# Patient Record
Sex: FEMALE | Race: ASIAN | NOT HISPANIC OR LATINO
[De-identification: names, ages, dates, MRNs, and addresses within clinical notes are randomized per-mention and may not be internally consistent; named-entity substitution may affect disease eponyms.]

---

## 2019-08-13 PROBLEM — Z00.00 ENCOUNTER FOR PREVENTIVE HEALTH EXAMINATION: Status: ACTIVE | Noted: 2019-08-13

## 2019-08-16 ENCOUNTER — APPOINTMENT (OUTPATIENT)
Dept: THORACIC SURGERY | Facility: CLINIC | Age: 49
End: 2019-08-16
Payer: MEDICAID

## 2019-08-16 VITALS
BODY MASS INDEX: 28.53 KG/M2 | SYSTOLIC BLOOD PRESSURE: 143 MMHG | HEIGHT: 63 IN | RESPIRATION RATE: 19 BRPM | OXYGEN SATURATION: 98 % | WEIGHT: 161 LBS | TEMPERATURE: 96.2 F | DIASTOLIC BLOOD PRESSURE: 79 MMHG | HEART RATE: 61 BPM

## 2019-08-16 DIAGNOSIS — Z86.79 PERSONAL HISTORY OF OTHER DISEASES OF THE CIRCULATORY SYSTEM: ICD-10-CM

## 2019-08-16 PROCEDURE — 99205 OFFICE O/P NEW HI 60 MIN: CPT

## 2019-08-19 PROBLEM — Z86.79 HISTORY OF HYPERTENSION: Status: RESOLVED | Noted: 2019-08-19 | Resolved: 2019-08-19

## 2019-08-19 RX ORDER — ESOMEPRAZOLE MAGNESIUM 20 MG/1
20 CAPSULE, DELAYED RELEASE ORAL
Refills: 0 | Status: ACTIVE | COMMUNITY

## 2019-10-07 ENCOUNTER — OUTPATIENT (OUTPATIENT)
Dept: OUTPATIENT SERVICES | Facility: HOSPITAL | Age: 49
LOS: 1 days | End: 2019-10-07
Payer: MEDICAID

## 2019-10-07 VITALS
OXYGEN SATURATION: 97 % | HEIGHT: 63 IN | BODY MASS INDEX: 28 KG/M2 | DIASTOLIC BLOOD PRESSURE: 78 MMHG | WEIGHT: 158 LBS | HEART RATE: 59 BPM | TEMPERATURE: 97.8 F | SYSTOLIC BLOOD PRESSURE: 135 MMHG

## 2019-10-07 DIAGNOSIS — Z01.818 ENCOUNTER FOR OTHER PREPROCEDURAL EXAMINATION: ICD-10-CM

## 2019-10-07 LAB
ALBUMIN SERPL ELPH-MCNC: 4.2 G/DL — SIGNIFICANT CHANGE UP (ref 3.3–5)
ALP SERPL-CCNC: 59 U/L — SIGNIFICANT CHANGE UP (ref 40–120)
ALT FLD-CCNC: 10 U/L — SIGNIFICANT CHANGE UP (ref 10–45)
ANION GAP SERPL CALC-SCNC: 9 MMOL/L — SIGNIFICANT CHANGE UP (ref 5–17)
APPEARANCE UR: CLEAR — SIGNIFICANT CHANGE UP
APTT BLD: 39.6 SEC — HIGH (ref 27.5–36.3)
AST SERPL-CCNC: 14 U/L — SIGNIFICANT CHANGE UP (ref 10–40)
BACTERIA # UR AUTO: PRESENT /HPF
BASOPHILS # BLD AUTO: 0.03 K/UL — SIGNIFICANT CHANGE UP (ref 0–0.2)
BASOPHILS NFR BLD AUTO: 0.7 % — SIGNIFICANT CHANGE UP (ref 0–2)
BILIRUB SERPL-MCNC: 0.7 MG/DL — SIGNIFICANT CHANGE UP (ref 0.2–1.2)
BILIRUB UR-MCNC: NEGATIVE — SIGNIFICANT CHANGE UP
BLD GP AB SCN SERPL QL: NEGATIVE — SIGNIFICANT CHANGE UP
BLD GP AB SCN SERPL QL: NEGATIVE — SIGNIFICANT CHANGE UP
BUN SERPL-MCNC: 20 MG/DL — SIGNIFICANT CHANGE UP (ref 7–23)
CALCIUM SERPL-MCNC: 9 MG/DL — SIGNIFICANT CHANGE UP (ref 8.4–10.5)
CHLORIDE SERPL-SCNC: 108 MMOL/L — SIGNIFICANT CHANGE UP (ref 96–108)
CHOLEST SERPL-MCNC: 142 MG/DL — SIGNIFICANT CHANGE UP (ref 10–199)
CO2 SERPL-SCNC: 27 MMOL/L — SIGNIFICANT CHANGE UP (ref 22–31)
COLOR SPEC: YELLOW — SIGNIFICANT CHANGE UP
CREAT SERPL-MCNC: 0.88 MG/DL — SIGNIFICANT CHANGE UP (ref 0.5–1.3)
DIFF PNL FLD: ABNORMAL
EOSINOPHIL # BLD AUTO: 0.18 K/UL — SIGNIFICANT CHANGE UP (ref 0–0.5)
EOSINOPHIL NFR BLD AUTO: 4 % — SIGNIFICANT CHANGE UP (ref 0–6)
EPI CELLS # UR: ABNORMAL /HPF (ref 0–5)
GLUCOSE SERPL-MCNC: 108 MG/DL — HIGH (ref 70–99)
GLUCOSE UR QL: NEGATIVE — SIGNIFICANT CHANGE UP
HCT VFR BLD CALC: 42.2 % — SIGNIFICANT CHANGE UP (ref 34.5–45)
HDLC SERPL-MCNC: 55 MG/DL — SIGNIFICANT CHANGE UP
HGB BLD-MCNC: 13.4 G/DL — SIGNIFICANT CHANGE UP (ref 11.5–15.5)
IMM GRANULOCYTES NFR BLD AUTO: 0.2 % — SIGNIFICANT CHANGE UP (ref 0–1.5)
INR BLD: 1.18 — HIGH (ref 0.88–1.16)
KETONES UR-MCNC: NEGATIVE — SIGNIFICANT CHANGE UP
LEUKOCYTE ESTERASE UR-ACNC: ABNORMAL
LIPID PNL WITH DIRECT LDL SERPL: 73 MG/DL — SIGNIFICANT CHANGE UP
LYMPHOCYTES # BLD AUTO: 1.18 K/UL — SIGNIFICANT CHANGE UP (ref 1–3.3)
LYMPHOCYTES # BLD AUTO: 26.5 % — SIGNIFICANT CHANGE UP (ref 13–44)
MCHC RBC-ENTMCNC: 28.4 PG — SIGNIFICANT CHANGE UP (ref 27–34)
MCHC RBC-ENTMCNC: 31.8 GM/DL — LOW (ref 32–36)
MCV RBC AUTO: 89.4 FL — SIGNIFICANT CHANGE UP (ref 80–100)
MONOCYTES # BLD AUTO: 0.3 K/UL — SIGNIFICANT CHANGE UP (ref 0–0.9)
MONOCYTES NFR BLD AUTO: 6.7 % — SIGNIFICANT CHANGE UP (ref 2–14)
NEUTROPHILS # BLD AUTO: 2.75 K/UL — SIGNIFICANT CHANGE UP (ref 1.8–7.4)
NEUTROPHILS NFR BLD AUTO: 61.9 % — SIGNIFICANT CHANGE UP (ref 43–77)
NITRITE UR-MCNC: NEGATIVE — SIGNIFICANT CHANGE UP
NRBC # BLD: 0 /100 WBCS — SIGNIFICANT CHANGE UP (ref 0–0)
PH UR: 6 — SIGNIFICANT CHANGE UP (ref 5–8)
PLATELET # BLD AUTO: 192 K/UL — SIGNIFICANT CHANGE UP (ref 150–400)
POTASSIUM SERPL-MCNC: 4.4 MMOL/L — SIGNIFICANT CHANGE UP (ref 3.5–5.3)
POTASSIUM SERPL-SCNC: 4.4 MMOL/L — SIGNIFICANT CHANGE UP (ref 3.5–5.3)
PROT SERPL-MCNC: 7.1 G/DL — SIGNIFICANT CHANGE UP (ref 6–8.3)
PROT UR-MCNC: NEGATIVE MG/DL — SIGNIFICANT CHANGE UP
PROTHROM AB SERPL-ACNC: 13.4 SEC — HIGH (ref 10–12.9)
RBC # BLD: 4.72 M/UL — SIGNIFICANT CHANGE UP (ref 3.8–5.2)
RBC # FLD: 12.8 % — SIGNIFICANT CHANGE UP (ref 10.3–14.5)
RBC CASTS # UR COMP ASSIST: < 5 /HPF — SIGNIFICANT CHANGE UP
RH IG SCN BLD-IMP: POSITIVE — SIGNIFICANT CHANGE UP
RH IG SCN BLD-IMP: POSITIVE — SIGNIFICANT CHANGE UP
SODIUM SERPL-SCNC: 144 MMOL/L — SIGNIFICANT CHANGE UP (ref 135–145)
SP GR SPEC: 1.02 — SIGNIFICANT CHANGE UP (ref 1–1.03)
TOTAL CHOLESTEROL/HDL RATIO MEASUREMENT: 2.6 RATIO — LOW (ref 3.3–7.1)
TRIGL SERPL-MCNC: 71 MG/DL — SIGNIFICANT CHANGE UP (ref 10–149)
UROBILINOGEN FLD QL: 0.2 E.U./DL — SIGNIFICANT CHANGE UP
WBC # BLD: 4.45 K/UL — SIGNIFICANT CHANGE UP (ref 3.8–10.5)
WBC # FLD AUTO: 4.45 K/UL — SIGNIFICANT CHANGE UP (ref 3.8–10.5)
WBC UR QL: < 5 /HPF — SIGNIFICANT CHANGE UP

## 2019-10-07 PROCEDURE — 86850 RBC ANTIBODY SCREEN: CPT

## 2019-10-07 PROCEDURE — 80053 COMPREHEN METABOLIC PANEL: CPT

## 2019-10-07 PROCEDURE — 80061 LIPID PANEL: CPT

## 2019-10-07 PROCEDURE — 85730 THROMBOPLASTIN TIME PARTIAL: CPT

## 2019-10-07 PROCEDURE — 86900 BLOOD TYPING SEROLOGIC ABO: CPT

## 2019-10-07 PROCEDURE — 85610 PROTHROMBIN TIME: CPT

## 2019-10-07 PROCEDURE — 86901 BLOOD TYPING SEROLOGIC RH(D): CPT

## 2019-10-07 PROCEDURE — 93005 ELECTROCARDIOGRAM TRACING: CPT

## 2019-10-07 PROCEDURE — 85025 COMPLETE CBC W/AUTO DIFF WBC: CPT

## 2019-10-07 PROCEDURE — 93010 ELECTROCARDIOGRAM REPORT: CPT

## 2019-10-07 PROCEDURE — 81001 URINALYSIS AUTO W/SCOPE: CPT

## 2019-10-23 RX ORDER — ALBUTEROL SULFATE 90 UG/1
108 (90 BASE) AEROSOL, METERED RESPIRATORY (INHALATION)
Refills: 0 | Status: ACTIVE | COMMUNITY

## 2019-10-23 RX ORDER — RANOLAZINE 500 MG/1
500 TABLET, FILM COATED, EXTENDED RELEASE ORAL
Refills: 0 | Status: ACTIVE | COMMUNITY

## 2019-10-23 RX ORDER — DOCUSATE SODIUM 100 MG/1
100 CAPSULE ORAL
Refills: 0 | Status: ACTIVE | COMMUNITY

## 2019-10-23 RX ORDER — TIOTROPIUM BROMIDE AND OLODATEROL 3.124; 2.736 UG/1; UG/1
SPRAY, METERED RESPIRATORY (INHALATION)
Refills: 0 | Status: ACTIVE | COMMUNITY

## 2019-10-23 RX ORDER — ATORVASTATIN CALCIUM 10 MG/1
10 TABLET, FILM COATED ORAL
Refills: 0 | Status: ACTIVE | COMMUNITY

## 2019-10-24 NOTE — ASU PATIENT PROFILE, ADULT - VISION (WITH CORRECTIVE LENSES IF THE PATIENT USUALLY WEARS THEM):
wears glasses wears glasses/Normal vision: sees adequately in most situations; can see medication labels, newsprint

## 2019-10-24 NOTE — CONSULT LETTER
[FreeTextEntry2] : Dr Joy Noyola [FreeTextEntry3] : Keith Florez MD\par Professor, Cardiovascular & Thoracic Surgery\par [facility]\par Director of the [facility] \par Director of Thoracic Surgery, [facility]\par [facility]\par [address]\par [facility] 4th Floor\par [place] [address]\par Phone: [phone]\par Fax: [phone]

## 2019-10-24 NOTE — ASSESSMENT
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 She reports persistent SOB and dysphagia and had hiatal hernia work up with at [facility] last year, but did not go through with surgery. She is tolerating PO intake and denies melena, constipation, abdominal pain. \par \par CT Chest 9/20/18\par - large 9.1cm fat-containing hiatal hernia. Underlying lipoma is not excluded\par - RML 4mm nodule\par - Aberrant right subclavian artery\par - probably subcentimeter liver cyst\par \par Upper GI Series 10/9/18\par - Normal GI series with no effect of large posterior mediastinal fatty mass on the esophagus or stomach. 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 \par \par CT Coronary 2/14/19: large right posterior hernia with adjacent compressive atelectasis of the RLL. \par \par Patient reports she is busy the next few months and unable to schedule surgery until December. Will repeat imaging prior to surgery. She is comfortable at rest and tolerating PO intake well. Instructed patient to RTC sooner if worsening SOB, abdominal pain, anemia. \par \par I have reviewed the patient's medical records and diagnostic images at the time of this office consultation and have made the following recommendation.\par Plan:\par 1. Medical clearance, cardiac clearance\par 2. Repeat CT Chest, UGI XR\par 3. EGD, laparoscopic, robotic assisted, repair of hiatal hernia, possible Toupet fundoplication, 12/6/19\par 4. Request manometry study EGD done at [facility]

## 2019-10-24 NOTE — HISTORY OF PRESENT ILLNESS
[FreeTextEntry1] : 49 year old female, nonsmoker, Cantonese speaking with pmhx of HTN, as referred by PCP Dr Joy Noyola for a large hiatal hernia. She reports persistent SOB and dysphagia and had hiatal hernia work up with at [facility] last year, but did not go through with surgery. She is tolerating PO intake and denies melena, constipation, abdominal pain. \par \par CT Chest 9/20/18\par - large 9.1cm fat-containing hiatal hernia. Underlying lipoma is not excluded\par - RML 4mm nodule\par - Aberrant right subclavian artery\par - probably subcentimeter liver cyst\par \par Upper GI Series 10/9/18\par - Normal GI series with no effect of large posterior mediastinal fatty mass on the esophagus or stomach. 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 \par \par CT Coronary 2/14/19: large right posterior hernia with adjacent compressive atelectasis of the RLL. \par

## 2019-10-25 ENCOUNTER — APPOINTMENT (OUTPATIENT)
Dept: THORACIC SURGERY | Facility: HOSPITAL | Age: 49
End: 2019-10-25

## 2019-10-25 ENCOUNTER — INPATIENT (INPATIENT)
Facility: HOSPITAL | Age: 49
LOS: 0 days | Discharge: HOME | DRG: 328 | End: 2019-10-26
Attending: THORACIC SURGERY (CARDIOTHORACIC VASCULAR SURGERY) | Admitting: THORACIC SURGERY (CARDIOTHORACIC VASCULAR SURGERY)
Payer: MEDICAID

## 2019-10-25 VITALS
WEIGHT: 160.94 LBS | TEMPERATURE: 99 F | HEART RATE: 71 BPM | HEIGHT: 63 IN | DIASTOLIC BLOOD PRESSURE: 90 MMHG | SYSTOLIC BLOOD PRESSURE: 134 MMHG | RESPIRATION RATE: 16 BRPM | OXYGEN SATURATION: 96 %

## 2019-10-25 DIAGNOSIS — Z98.890 OTHER SPECIFIED POSTPROCEDURAL STATES: Chronic | ICD-10-CM

## 2019-10-25 LAB
ANION GAP SERPL CALC-SCNC: 13 MMOL/L — SIGNIFICANT CHANGE UP (ref 5–17)
APTT BLD: 45.1 SEC — HIGH (ref 27.5–36.3)
BASOPHILS # BLD AUTO: 0.04 K/UL — SIGNIFICANT CHANGE UP (ref 0–0.2)
BASOPHILS NFR BLD AUTO: 0.6 % — SIGNIFICANT CHANGE UP (ref 0–2)
BLD GP AB SCN SERPL QL: NEGATIVE — SIGNIFICANT CHANGE UP
BUN SERPL-MCNC: 15 MG/DL — SIGNIFICANT CHANGE UP (ref 7–23)
CALCIUM SERPL-MCNC: 9.2 MG/DL — SIGNIFICANT CHANGE UP (ref 8.4–10.5)
CHLORIDE SERPL-SCNC: 104 MMOL/L — SIGNIFICANT CHANGE UP (ref 96–108)
CO2 SERPL-SCNC: 25 MMOL/L — SIGNIFICANT CHANGE UP (ref 22–31)
CREAT SERPL-MCNC: 0.96 MG/DL — SIGNIFICANT CHANGE UP (ref 0.5–1.3)
EOSINOPHIL # BLD AUTO: 0.07 K/UL — SIGNIFICANT CHANGE UP (ref 0–0.5)
EOSINOPHIL NFR BLD AUTO: 1 % — SIGNIFICANT CHANGE UP (ref 0–6)
GLUCOSE SERPL-MCNC: 112 MG/DL — HIGH (ref 70–99)
HCT VFR BLD CALC: 48.4 % — HIGH (ref 34.5–45)
HGB BLD-MCNC: 15.3 G/DL — SIGNIFICANT CHANGE UP (ref 11.5–15.5)
IMM GRANULOCYTES NFR BLD AUTO: 0.3 % — SIGNIFICANT CHANGE UP (ref 0–1.5)
INR BLD: 1.19 — HIGH (ref 0.88–1.16)
LYMPHOCYTES # BLD AUTO: 1.28 K/UL — SIGNIFICANT CHANGE UP (ref 1–3.3)
LYMPHOCYTES # BLD AUTO: 18.3 % — SIGNIFICANT CHANGE UP (ref 13–44)
MAGNESIUM SERPL-MCNC: 2.5 MG/DL — SIGNIFICANT CHANGE UP (ref 1.6–2.6)
MCHC RBC-ENTMCNC: 28.9 PG — SIGNIFICANT CHANGE UP (ref 27–34)
MCHC RBC-ENTMCNC: 31.6 GM/DL — LOW (ref 32–36)
MCV RBC AUTO: 91.3 FL — SIGNIFICANT CHANGE UP (ref 80–100)
MONOCYTES # BLD AUTO: 0.3 K/UL — SIGNIFICANT CHANGE UP (ref 0–0.9)
MONOCYTES NFR BLD AUTO: 4.3 % — SIGNIFICANT CHANGE UP (ref 2–14)
NEUTROPHILS # BLD AUTO: 5.3 K/UL — SIGNIFICANT CHANGE UP (ref 1.8–7.4)
NEUTROPHILS NFR BLD AUTO: 75.5 % — SIGNIFICANT CHANGE UP (ref 43–77)
NRBC # BLD: 0 /100 WBCS — SIGNIFICANT CHANGE UP (ref 0–0)
PLATELET # BLD AUTO: 194 K/UL — SIGNIFICANT CHANGE UP (ref 150–400)
POTASSIUM SERPL-MCNC: 4 MMOL/L — SIGNIFICANT CHANGE UP (ref 3.5–5.3)
POTASSIUM SERPL-SCNC: 4 MMOL/L — SIGNIFICANT CHANGE UP (ref 3.5–5.3)
PROTHROM AB SERPL-ACNC: 13.5 SEC — HIGH (ref 10–12.9)
RBC # BLD: 5.3 M/UL — HIGH (ref 3.8–5.2)
RBC # FLD: 12.5 % — SIGNIFICANT CHANGE UP (ref 10.3–14.5)
RH IG SCN BLD-IMP: POSITIVE — SIGNIFICANT CHANGE UP
SODIUM SERPL-SCNC: 142 MMOL/L — SIGNIFICANT CHANGE UP (ref 135–145)
WBC # BLD: 7.01 K/UL — SIGNIFICANT CHANGE UP (ref 3.8–10.5)
WBC # FLD AUTO: 7.01 K/UL — SIGNIFICANT CHANGE UP (ref 3.8–10.5)

## 2019-10-25 PROCEDURE — 43235 EGD DIAGNOSTIC BRUSH WASH: CPT | Mod: 59

## 2019-10-25 PROCEDURE — 43281 LAP PARAESOPHAG HERN REPAIR: CPT

## 2019-10-25 PROCEDURE — S2900 ROBOTIC SURGICAL SYSTEM: CPT | Mod: NC

## 2019-10-25 PROCEDURE — 71045 X-RAY EXAM CHEST 1 VIEW: CPT | Mod: 26

## 2019-10-25 RX ORDER — CEFAZOLIN SODIUM 1 G
2000 VIAL (EA) INJECTION EVERY 8 HOURS
Refills: 0 | Status: COMPLETED | OUTPATIENT
Start: 2019-10-25 | End: 2019-10-26

## 2019-10-25 RX ORDER — ACETAMINOPHEN 500 MG
500 TABLET ORAL EVERY 4 HOURS
Refills: 0 | Status: DISCONTINUED | OUTPATIENT
Start: 2019-10-25 | End: 2019-10-26

## 2019-10-25 RX ORDER — KETOROLAC TROMETHAMINE 30 MG/ML
30 SYRINGE (ML) INJECTION ONCE
Refills: 0 | Status: DISCONTINUED | OUTPATIENT
Start: 2019-10-25 | End: 2019-10-25

## 2019-10-25 RX ORDER — ACETAMINOPHEN 500 MG
1000 TABLET ORAL ONCE
Refills: 0 | Status: COMPLETED | OUTPATIENT
Start: 2019-10-25 | End: 2019-10-25

## 2019-10-25 RX ORDER — HEPARIN SODIUM 5000 [USP'U]/ML
5000 INJECTION INTRAVENOUS; SUBCUTANEOUS EVERY 8 HOURS
Refills: 0 | Status: DISCONTINUED | OUTPATIENT
Start: 2019-10-25 | End: 2019-10-26

## 2019-10-25 RX ORDER — MORPHINE SULFATE 50 MG/1
2 CAPSULE, EXTENDED RELEASE ORAL ONCE
Refills: 0 | Status: DISCONTINUED | OUTPATIENT
Start: 2019-10-25 | End: 2019-10-26

## 2019-10-25 RX ORDER — SODIUM CHLORIDE 9 MG/ML
1000 INJECTION, SOLUTION INTRAVENOUS
Refills: 0 | Status: DISCONTINUED | OUTPATIENT
Start: 2019-10-25 | End: 2019-10-26

## 2019-10-25 RX ORDER — KETOROLAC TROMETHAMINE 30 MG/ML
30 SYRINGE (ML) INJECTION EVERY 6 HOURS
Refills: 0 | Status: DISCONTINUED | OUTPATIENT
Start: 2019-10-25 | End: 2019-10-26

## 2019-10-25 RX ORDER — FENTANYL CITRATE 50 UG/ML
25 INJECTION INTRAVENOUS ONCE
Refills: 0 | Status: DISCONTINUED | OUTPATIENT
Start: 2019-10-25 | End: 2019-10-25

## 2019-10-25 RX ORDER — OXYCODONE HYDROCHLORIDE 5 MG/1
5 TABLET ORAL EVERY 4 HOURS
Refills: 0 | Status: DISCONTINUED | OUTPATIENT
Start: 2019-10-25 | End: 2019-10-26

## 2019-10-25 RX ORDER — CEFAZOLIN SODIUM 1 G
2000 VIAL (EA) INJECTION EVERY 8 HOURS
Refills: 0 | Status: DISCONTINUED | OUTPATIENT
Start: 2019-10-25 | End: 2019-10-25

## 2019-10-25 RX ADMIN — HEPARIN SODIUM 5000 UNIT(S): 5000 INJECTION INTRAVENOUS; SUBCUTANEOUS at 14:14

## 2019-10-25 RX ADMIN — HEPARIN SODIUM 5000 UNIT(S): 5000 INJECTION INTRAVENOUS; SUBCUTANEOUS at 22:57

## 2019-10-25 RX ADMIN — Medication 1000 MILLIGRAM(S): at 14:31

## 2019-10-25 RX ADMIN — Medication 400 MILLIGRAM(S): at 14:08

## 2019-10-25 RX ADMIN — Medication 30 MILLIGRAM(S): at 21:00

## 2019-10-25 RX ADMIN — Medication 30 MILLIGRAM(S): at 13:30

## 2019-10-25 RX ADMIN — Medication 30 MILLIGRAM(S): at 13:03

## 2019-10-25 RX ADMIN — Medication 2000 MILLIGRAM(S): at 14:31

## 2019-10-25 RX ADMIN — Medication 1000 MILLIGRAM(S): at 22:29

## 2019-10-25 RX ADMIN — Medication 400 MILLIGRAM(S): at 21:24

## 2019-10-25 RX ADMIN — Medication 30 MILLIGRAM(S): at 19:53

## 2019-10-25 RX ADMIN — Medication 2000 MILLIGRAM(S): at 22:58

## 2019-10-25 NOTE — BRIEF OPERATIVE NOTE - NSICDXBRIEFPROCEDURE_GEN_ALL_CORE_FT
PROCEDURES:  Laparoscopic herniorrhaphy of hiatal hernia 25-Oct-2019 09:13:28 robotic assisted Micaela Weston

## 2019-10-26 ENCOUNTER — TRANSCRIPTION ENCOUNTER (OUTPATIENT)
Age: 49
End: 2019-10-26

## 2019-10-26 VITALS
RESPIRATION RATE: 12 BRPM | OXYGEN SATURATION: 96 % | SYSTOLIC BLOOD PRESSURE: 123 MMHG | HEART RATE: 80 BPM | DIASTOLIC BLOOD PRESSURE: 73 MMHG

## 2019-10-26 LAB
ANION GAP SERPL CALC-SCNC: 11 MMOL/L — SIGNIFICANT CHANGE UP (ref 5–17)
BASOPHILS # BLD AUTO: 0.04 K/UL — SIGNIFICANT CHANGE UP (ref 0–0.2)
BASOPHILS NFR BLD AUTO: 0.5 % — SIGNIFICANT CHANGE UP (ref 0–2)
BUN SERPL-MCNC: 12 MG/DL — SIGNIFICANT CHANGE UP (ref 7–23)
CALCIUM SERPL-MCNC: 8.7 MG/DL — SIGNIFICANT CHANGE UP (ref 8.4–10.5)
CHLORIDE SERPL-SCNC: 103 MMOL/L — SIGNIFICANT CHANGE UP (ref 96–108)
CO2 SERPL-SCNC: 26 MMOL/L — SIGNIFICANT CHANGE UP (ref 22–31)
CREAT SERPL-MCNC: 0.93 MG/DL — SIGNIFICANT CHANGE UP (ref 0.5–1.3)
EOSINOPHIL # BLD AUTO: 0.02 K/UL — SIGNIFICANT CHANGE UP (ref 0–0.5)
EOSINOPHIL NFR BLD AUTO: 0.2 % — SIGNIFICANT CHANGE UP (ref 0–6)
GLUCOSE SERPL-MCNC: 103 MG/DL — HIGH (ref 70–99)
HCT VFR BLD CALC: 41.1 % — SIGNIFICANT CHANGE UP (ref 34.5–45)
HGB BLD-MCNC: 13.1 G/DL — SIGNIFICANT CHANGE UP (ref 11.5–15.5)
IMM GRANULOCYTES NFR BLD AUTO: 0.2 % — SIGNIFICANT CHANGE UP (ref 0–1.5)
LYMPHOCYTES # BLD AUTO: 1.38 K/UL — SIGNIFICANT CHANGE UP (ref 1–3.3)
LYMPHOCYTES # BLD AUTO: 15.8 % — SIGNIFICANT CHANGE UP (ref 13–44)
MCHC RBC-ENTMCNC: 28.4 PG — SIGNIFICANT CHANGE UP (ref 27–34)
MCHC RBC-ENTMCNC: 31.9 GM/DL — LOW (ref 32–36)
MCV RBC AUTO: 89 FL — SIGNIFICANT CHANGE UP (ref 80–100)
MONOCYTES # BLD AUTO: 0.52 K/UL — SIGNIFICANT CHANGE UP (ref 0–0.9)
MONOCYTES NFR BLD AUTO: 6 % — SIGNIFICANT CHANGE UP (ref 2–14)
NEUTROPHILS # BLD AUTO: 6.74 K/UL — SIGNIFICANT CHANGE UP (ref 1.8–7.4)
NEUTROPHILS NFR BLD AUTO: 77.3 % — HIGH (ref 43–77)
NRBC # BLD: 0 /100 WBCS — SIGNIFICANT CHANGE UP (ref 0–0)
PLATELET # BLD AUTO: 197 K/UL — SIGNIFICANT CHANGE UP (ref 150–400)
POTASSIUM SERPL-MCNC: 3.6 MMOL/L — SIGNIFICANT CHANGE UP (ref 3.5–5.3)
POTASSIUM SERPL-SCNC: 3.6 MMOL/L — SIGNIFICANT CHANGE UP (ref 3.5–5.3)
RBC # BLD: 4.62 M/UL — SIGNIFICANT CHANGE UP (ref 3.8–5.2)
RBC # FLD: 12.2 % — SIGNIFICANT CHANGE UP (ref 10.3–14.5)
SODIUM SERPL-SCNC: 140 MMOL/L — SIGNIFICANT CHANGE UP (ref 135–145)
WBC # BLD: 8.72 K/UL — SIGNIFICANT CHANGE UP (ref 3.8–10.5)
WBC # FLD AUTO: 8.72 K/UL — SIGNIFICANT CHANGE UP (ref 3.8–10.5)

## 2019-10-26 PROCEDURE — 71045 X-RAY EXAM CHEST 1 VIEW: CPT | Mod: 26

## 2019-10-26 RX ORDER — ACETAMINOPHEN 500 MG
15.63 TABLET ORAL
Qty: 500 | Refills: 0
Start: 2019-10-26 | End: 2019-11-01

## 2019-10-26 RX ORDER — LOSARTAN POTASSIUM 100 MG/1
1 TABLET, FILM COATED ORAL
Qty: 0 | Refills: 0 | DISCHARGE

## 2019-10-26 RX ORDER — VITAMIN E 100 UNIT
1 CAPSULE ORAL
Qty: 0 | Refills: 0 | DISCHARGE

## 2019-10-26 RX ORDER — LORATADINE 10 MG/1
1 TABLET ORAL
Qty: 0 | Refills: 0 | DISCHARGE

## 2019-10-26 RX ORDER — ATORVASTATIN CALCIUM 80 MG/1
1 TABLET, FILM COATED ORAL
Qty: 0 | Refills: 0 | DISCHARGE

## 2019-10-26 RX ORDER — IBUPROFEN 200 MG
1 TABLET ORAL
Qty: 0 | Refills: 0 | DISCHARGE

## 2019-10-26 RX ORDER — RANOLAZINE 500 MG/1
1 TABLET, FILM COATED, EXTENDED RELEASE ORAL
Qty: 0 | Refills: 0 | DISCHARGE

## 2019-10-26 RX ORDER — TIOTROPIUM BROMIDE AND OLODATEROL 3.124; 2.736 UG/1; UG/1
2 SPRAY, METERED RESPIRATORY (INHALATION)
Qty: 0 | Refills: 0 | DISCHARGE

## 2019-10-26 RX ORDER — DOCUSATE SODIUM 100 MG
0 CAPSULE ORAL
Qty: 0 | Refills: 0 | DISCHARGE

## 2019-10-26 RX ORDER — ICOSAPENT ETHYL 500 MG/1
2 CAPSULE, LIQUID FILLED ORAL
Qty: 0 | Refills: 0 | DISCHARGE

## 2019-10-26 RX ADMIN — HEPARIN SODIUM 5000 UNIT(S): 5000 INJECTION INTRAVENOUS; SUBCUTANEOUS at 14:23

## 2019-10-26 RX ADMIN — SODIUM CHLORIDE 50 MILLILITER(S): 9 INJECTION, SOLUTION INTRAVENOUS at 06:29

## 2019-10-26 RX ADMIN — Medication 2000 MILLIGRAM(S): at 07:23

## 2019-10-26 RX ADMIN — HEPARIN SODIUM 5000 UNIT(S): 5000 INJECTION INTRAVENOUS; SUBCUTANEOUS at 06:28

## 2019-10-26 RX ADMIN — Medication 500 MILLIGRAM(S): at 15:58

## 2019-10-26 NOTE — PATIENT PROFILE ADULT - PRO INTERPRETER NEED 2
There are no preventive care reminders to display for this patient.    Patient is up to date, no discussion needed.             Cantonese

## 2019-10-26 NOTE — DISCHARGE NOTE NURSING/CASE MANAGEMENT/SOCIAL WORK - NSDCFUADDAPPT_GEN_ALL_CORE_FT
-Please follow up with Dr. Florez on Nov 1st, 2019.  The office is located at [facility], [facility], 4th floor. please call to confirm the appointment #[phone].    -Walk daily as tolerated and use your incentive spirometer every hour.    -No driving or strenuous activity/exercise for 6 weeks, or until cleared by your surgeon.    -Gently clean your incisions with anti-bacterial soap and water, pat dry.  You may leave them open to air.    -Call your doctor if you have shortness of breath, chest pain not relieved by pain medication, dizziness, fever >101.5, or increased redness or drainage from incisions.

## 2019-10-26 NOTE — PROGRESS NOTE ADULT - SUBJECTIVE AND OBJECTIVE BOX
Patient discussed on morning rounds with       Operation / Date: 10/25/19 robotic assisted hiatal hernia repair      SUBJECTIVE ASSESSMENT:  49y Female with PMH significant for Hiatal hernia underwent robotic assisted repair is visited at bedside.  Overnight, patient reported some fullness at her lower abdomen and decrease urine output.  A jasso was placed and evacuated about 100cc of urine without hematuria.  The jasso was removed this morning and TOV is pending.  In the meantime, patient tolerated clear fluid diet; she denies nausea, vomiting or shortness of breath.  She reports some regurgitation at initial oral intake.  It has improved throughout the day.     Vital Signs Last 24 Hrs  T(C): 37.6 (26 Oct 2019 13:39), Max: 37.7 (25 Oct 2019 21:49)  T(F): 99.6 (26 Oct 2019 13:39), Max: 99.9 (25 Oct 2019 21:49)  HR: 94 (26 Oct 2019 10:50) (70 - 882)  BP: 135/85 (26 Oct 2019 10:50) (113/71 - 143/84)  BP(mean): 105 (26 Oct 2019 10:50) (87 - 114)  RR: 12 (26 Oct 2019 10:50) (12 - 22)  SpO2: 93% (26 Oct 2019 10:50) (93% - 97%)  I&O's Detail    25 Oct 2019 07:01  -  26 Oct 2019 07:00  --------------------------------------------------------  IN:    lactated ringers.: 850 mL    Oral Fluid: 730 mL  Total IN: 1580 mL    OUT:    Ureteral Catheter: 1620 mL    Voided: 325 mL  Total OUT: 1945 mL    Total NET: -365 mL      26 Oct 2019 07:01  -  26 Oct 2019 13:47  --------------------------------------------------------  IN:  Total IN: 0 mL    OUT:    Ureteral Catheter: 430 mL  Total OUT: 430 mL    Total NET: -430 mL      CHEST TUBE: no  EFRAÍN DRAIN: no  EPICARDIAL WIRES: no  TIE DOWNS: no  JASSO: yes and removed after AM round    PHYSICAL EXAM:    General: NAD.  WDWN    Neurological: grossly intact      Cardiovascular: RRR without murmur     Respiratory: no wheezing and no rhonchi     Gastrointestinal: BM and BS are intact     Extremities: no edema     Vascular: pulses are intact bilaterally     Incision Sites: n/a    LABS:                        13.1   8.72  )-----------( 197      ( 26 Oct 2019 07:32 )             41.1       COUMADIN: no    PT/INR - ( 25 Oct 2019 07:03 )   PT: 13.5 sec;   INR: 1.19     PTT - ( 25 Oct 2019 07:03 )  PTT:45.1 sec    10-26    140  |  103  |  12  ----------------------------<  103<H>  3.6   |  26  |  0.93    Ca    8.7      26 Oct 2019 07:32  Mg     2.5     10-25      MEDICATIONS  (STANDING):  heparin  Injectable 5000 Unit(s) SubCutaneous every 8 hours  lactated ringers. 1000 milliLiter(s) (50 mL/Hr) IV Continuous <Continuous>    MEDICATIONS  (PRN):  acetaminophen    Suspension .. 500 milliGRAM(s) Oral every 4 hours PRN Mild Pain (1 - 3)  ketorolac   Injectable 30 milliGRAM(s) IV Push every 6 hours PRN Moderate Pain (4 - 6)  morphine  - Injectable 2 milliGRAM(s) IV Push once PRN Severe Pain (7 - 10)  oxyCODONE    Solution 5 milliGRAM(s) Oral every 4 hours PRN Severe Pain (7 - 10)      RADIOLOGY & ADDITIONAL TESTS:  CXR  < from: Xray Chest 1 View AP/PA (10.26.19 @ 07:28) >  Portable examination the chest demonstrates the heart to be within normal   limits in transverse diameter. Free air noted beneath the right   hemidiaphragm consistent with postoperative change. Correlation   recommended. Mild dextroscoliosis thoracic spine.    Impression: Increased density noted left left lung apex. Correlation with   CT examination recommended.Free air noted beneath the right   hemidiaphragm consistent with postoperative change. Clinical correlation   recommended

## 2019-10-26 NOTE — DISCHARGE NOTE PROVIDER - PROVIDER TOKENS
FREE:[LAST:[Gautam],FIRST:[Keith],PHONE:[phone]],FAX:[(   )    -],ADDRESS:[[address] [place] [address]]]

## 2019-10-26 NOTE — DISCHARGE NOTE NURSING/CASE MANAGEMENT/SOCIAL WORK - PATIENT PORTAL LINK FT
You can access the FollowMyHealth Patient Portal offered by [facility] by registering at the following website: [email or web address]. By joining [employer]’s FollowMyHealth portal, you will also be able to view your health information using other applications (apps) compatible with our system.

## 2019-10-26 NOTE — DISCHARGE NOTE PROVIDER - CARE PROVIDER_API CALL
Keith Florez  [address] [place] [address] 4th [place] [address]  Phone: [phone]  Fax: (   )    -  Follow Up Time:

## 2019-10-26 NOTE — DISCHARGE NOTE PROVIDER - NSDCACTIVITY_GEN_ALL_CORE
No heavy lifting/straining/Walking - Indoors allowed/Showering allowed/Do not make important decisions/Do not drive or operate machinery/Stairs allowed/Walking - Outdoors allowed

## 2019-10-26 NOTE — DISCHARGE NOTE PROVIDER - HOSPITAL COURSE
49 year old female, nonsmoker, Cantonese speaking with pmhx of HTN, as referred by PCP Dr Joy Noyola for a large hiatal hernia. She is tolerating PO intake and denies melena, constipation, abdominal pain. She now presents for her planned EGD, laparoscopic robotic assisted hiatal hernia repair with possible toupet fundoplication. She is seen in [facility] in her usual state of health with no acute complaints.         On 10/25/19, patient is taken to the operation room; a hiatal hernia repair robotic assisted was performed.  Patient tolerates the procedure well.  For further details, please refer the operative reports.  Postoperatively, patient was extubated without difficulty and recovered from her anesthesia in PACU.  She was then stepped down for further care.  On POD#0-1, patient start oral intake with clear liquid diet.  She tolerates.  Overnight, she reports a fullness sensation in her lower abdomen and decrease of UOP.  A jasso was inserted and approximate 100cc of urine was evacuated.  Patient tolerates the procedure well.  On POD#1, patient advanced her diet to full liquid.  She has passed her trial of void.  Patient is discharge to home with full liquid diet.  She is instructed to follow up with Dr. Florez as an outpatient setting.  She will be again evaluated for her oral intake and may advance per tolerance.          35 minutes was spent with the patient reviewing the discharge material including medications, follow up appointments, recovery, concerning symptoms, and how to contact their health care providers if they have questions

## 2019-10-26 NOTE — PROGRESS NOTE ADULT - ASSESSMENT
49y Female with PMH significant for Hiatal hernia underwent robotic assisted repair is visited at bedside.  Overnight, patient reported some fullness at her lower abdomen and decrease urine output.  A jasso was placed and evacuated about 100cc of urine without hematuria.  The jasso was removed this morning and TOV is pending.  In the meantime, patient tolerated clear fluid diet; she denies nausea, vomiting or shortness of breath.  She reports some regurgitation at initial oral intake.  It has improved throughout the day.     Problem #1 hiatal hernia with s/p repair   - clear liquid diet continues   - advance to full liquid diet at lunch     problem #2 decrease UOP  - increase oral intake with fluid.   - removed jasso and continue to monitor

## 2019-10-26 NOTE — DISCHARGE NOTE PROVIDER - NSDCFUADDAPPT_GEN_ALL_CORE_FT
-Please follow up with Dr. Florez on Nov 1st, 2019.  The office is located at [facility], [facility], 4th floor. please call to confirm the appointment #[phone].    -Walk daily as tolerated and use your incentive spirometer every hour.    -No driving or strenuous activity/exercise for 6 weeks, or until cleared by your surgeon.    -Gently clean your incisions with anti-bacterial soap and water, pat dry.  You may leave them open to air.    -Call your doctor if you have shortness of breath, chest pain not relieved by pain medication, dizziness, fever >101.5, or increased redness or drainage from incisions.

## 2019-10-29 PROBLEM — E07.9 DISORDER OF THYROID, UNSPECIFIED: Chronic | Status: ACTIVE | Noted: 2019-10-24

## 2019-10-29 PROBLEM — E78.5 HYPERLIPIDEMIA, UNSPECIFIED: Chronic | Status: ACTIVE | Noted: 2019-10-24

## 2019-10-29 PROBLEM — I25.10 ATHEROSCLEROTIC HEART DISEASE OF NATIVE CORONARY ARTERY WITHOUT ANGINA PECTORIS: Chronic | Status: ACTIVE | Noted: 2019-10-24

## 2019-10-29 PROBLEM — K44.9 DIAPHRAGMATIC HERNIA WITHOUT OBSTRUCTION OR GANGRENE: Chronic | Status: ACTIVE | Noted: 2019-10-24

## 2019-10-29 PROBLEM — I10 ESSENTIAL (PRIMARY) HYPERTENSION: Chronic | Status: ACTIVE | Noted: 2019-10-24

## 2019-10-29 PROBLEM — J44.9 CHRONIC OBSTRUCTIVE PULMONARY DISEASE, UNSPECIFIED: Chronic | Status: ACTIVE | Noted: 2019-10-24

## 2019-10-31 DIAGNOSIS — K44.9 DIAPHRAGMATIC HERNIA WITHOUT OBSTRUCTION OR GANGRENE: ICD-10-CM

## 2019-10-31 DIAGNOSIS — I10 ESSENTIAL (PRIMARY) HYPERTENSION: ICD-10-CM

## 2019-11-06 PROCEDURE — 85025 COMPLETE CBC W/AUTO DIFF WBC: CPT

## 2019-11-06 PROCEDURE — 85610 PROTHROMBIN TIME: CPT

## 2019-11-06 PROCEDURE — 80048 BASIC METABOLIC PNL TOTAL CA: CPT

## 2019-11-06 PROCEDURE — 86850 RBC ANTIBODY SCREEN: CPT

## 2019-11-06 PROCEDURE — S2900: CPT

## 2019-11-06 PROCEDURE — 85730 THROMBOPLASTIN TIME PARTIAL: CPT

## 2019-11-06 PROCEDURE — 36415 COLL VENOUS BLD VENIPUNCTURE: CPT

## 2019-11-06 PROCEDURE — 71045 X-RAY EXAM CHEST 1 VIEW: CPT

## 2019-11-06 PROCEDURE — 86901 BLOOD TYPING SEROLOGIC RH(D): CPT

## 2019-11-06 PROCEDURE — 83735 ASSAY OF MAGNESIUM: CPT

## 2019-11-06 PROCEDURE — 86900 BLOOD TYPING SEROLOGIC ABO: CPT

## 2019-11-08 ENCOUNTER — APPOINTMENT (OUTPATIENT)
Dept: THORACIC SURGERY | Facility: CLINIC | Age: 49
End: 2019-11-08
Payer: MEDICAID

## 2019-11-08 VITALS
OXYGEN SATURATION: 97 % | SYSTOLIC BLOOD PRESSURE: 112 MMHG | HEART RATE: 70 BPM | BODY MASS INDEX: 27.29 KG/M2 | TEMPERATURE: 98.2 F | WEIGHT: 154 LBS | HEIGHT: 63 IN | DIASTOLIC BLOOD PRESSURE: 78 MMHG | RESPIRATION RATE: 19 BRPM

## 2019-11-08 PROCEDURE — 99024 POSTOP FOLLOW-UP VISIT: CPT

## 2019-11-25 RX ORDER — ACETAMINOPHEN 160 MG/5ML
160 LIQUID ORAL EVERY 6 HOURS
Qty: 1 | Refills: 0 | Status: ACTIVE | COMMUNITY
Start: 2019-11-25 | End: 1900-01-01

## 2019-12-05 ENCOUNTER — FORM ENCOUNTER (OUTPATIENT)
Age: 49
End: 2019-12-05

## 2019-12-06 ENCOUNTER — OUTPATIENT (OUTPATIENT)
Dept: OUTPATIENT SERVICES | Facility: HOSPITAL | Age: 49
LOS: 1 days | End: 2019-12-06
Payer: MEDICAID

## 2019-12-06 ENCOUNTER — APPOINTMENT (OUTPATIENT)
Dept: THORACIC SURGERY | Facility: CLINIC | Age: 49
End: 2019-12-06
Payer: MEDICAID

## 2019-12-06 VITALS
OXYGEN SATURATION: 96 % | SYSTOLIC BLOOD PRESSURE: 122 MMHG | TEMPERATURE: 96.5 F | RESPIRATION RATE: 18 BRPM | DIASTOLIC BLOOD PRESSURE: 79 MMHG | HEART RATE: 60 BPM | HEIGHT: 63 IN | WEIGHT: 154 LBS | BODY MASS INDEX: 27.29 KG/M2

## 2019-12-06 DIAGNOSIS — Z86.79 PERSONAL HISTORY OF OTHER DISEASES OF THE CIRCULATORY SYSTEM: ICD-10-CM

## 2019-12-06 DIAGNOSIS — Z98.890 OTHER SPECIFIED POSTPROCEDURAL STATES: Chronic | ICD-10-CM

## 2019-12-06 DIAGNOSIS — Z09 ENCOUNTER FOR FOLLOW-UP EXAMINATION AFTER COMPLETED TREATMENT FOR CONDITIONS OTHER THAN MALIGNANT NEOPLASM: ICD-10-CM

## 2019-12-06 PROCEDURE — 71046 X-RAY EXAM CHEST 2 VIEWS: CPT

## 2019-12-06 PROCEDURE — 71046 X-RAY EXAM CHEST 2 VIEWS: CPT | Mod: 26

## 2019-12-06 PROCEDURE — 99024 POSTOP FOLLOW-UP VISIT: CPT

## 2019-12-09 PROBLEM — Z86.79 HISTORY OF RAYNAUD'S SYNDROME: Status: RESOLVED | Noted: 2019-12-09 | Resolved: 2019-12-09

## 2019-12-09 PROBLEM — Z09 POSTOP CHECK: Status: ACTIVE | Noted: 2019-11-04

## 2020-03-06 ENCOUNTER — APPOINTMENT (OUTPATIENT)
Dept: THORACIC SURGERY | Facility: CLINIC | Age: 50
End: 2020-03-06
Payer: MEDICAID

## 2020-03-06 VITALS
RESPIRATION RATE: 18 BRPM | TEMPERATURE: 97.6 F | HEART RATE: 68 BPM | HEIGHT: 63 IN | DIASTOLIC BLOOD PRESSURE: 86 MMHG | SYSTOLIC BLOOD PRESSURE: 128 MMHG | WEIGHT: 156 LBS | OXYGEN SATURATION: 97 % | BODY MASS INDEX: 27.64 KG/M2

## 2020-03-06 DIAGNOSIS — R21 RASH AND OTHER NONSPECIFIC SKIN ERUPTION: ICD-10-CM

## 2020-03-06 DIAGNOSIS — K59.00 CONSTIPATION, UNSPECIFIED: ICD-10-CM

## 2020-03-06 PROCEDURE — 99213 OFFICE O/P EST LOW 20 MIN: CPT

## 2020-03-06 RX ORDER — SENNOSIDES 8.6 MG TABLETS 8.6 MG/1
8.6 TABLET ORAL TWICE DAILY
Qty: 28 | Refills: 1 | Status: ACTIVE | COMMUNITY
Start: 2019-12-17 | End: 1900-01-01

## 2020-03-06 NOTE — PHYSICAL EXAM
[] : no respiratory distress [Respiration, Rhythm And Depth] : normal respiratory rhythm and effort [Exaggerated Use Of Accessory Muscles For Inspiration] : no accessory muscle use [Auscultation Breath Sounds / Voice Sounds] : lungs were clear to auscultation bilaterally [Apical Impulse] : the apical impulse was normal [Heart Sounds] : normal S1 and S2 [Examination Of The Chest] : the chest was normal in appearance [2+] : left 2+ [Abnormal Walk] : normal gait [Musculoskeletal - Swelling] : no joint swelling seen [No Focal Deficits] : no focal deficits

## 2020-04-24 ENCOUNTER — APPOINTMENT (OUTPATIENT)
Dept: THORACIC SURGERY | Facility: CLINIC | Age: 50
End: 2020-04-24
Payer: MEDICAID

## 2020-04-24 DIAGNOSIS — R10.9 UNSPECIFIED ABDOMINAL PAIN: ICD-10-CM

## 2020-04-24 PROBLEM — K59.00 CONSTIPATION: Status: ACTIVE | Noted: 2020-04-24

## 2020-04-24 PROCEDURE — 99441: CPT

## 2020-04-24 RX ORDER — DOCUSATE SODIUM 100 MG/1
100 CAPSULE, LIQUID FILLED ORAL 3 TIMES DAILY
Qty: 90 | Refills: 0 | Status: ACTIVE | COMMUNITY
Start: 2020-04-24 | End: 1900-01-01

## 2020-04-24 RX ORDER — HYDROCORTISONE 25 MG/G
2.5 CREAM TOPICAL 3 TIMES DAILY
Qty: 1 | Refills: 1 | Status: ACTIVE | COMMUNITY
Start: 2019-12-06 | End: 1900-01-01

## 2020-04-24 NOTE — ASSESSMENT
[FreeTextEntry1] : 50 year old female, nonsmoker, Cantonese speaking with pmhx of HTN, as referred by PCP Dr Joy Noyola for a large hiatal hernia. She is s/p EGD, laparoscopy, robotic assisted, reduction of herniated omentum in the paraesophageal hernia, primary repair of paraesophageal hernia on 10/25/19. \par \par Patient doing well, tolerating PO intake well. Denies dysphagia, abdominal pain, nausea, vomiting, diarrhea, constipation, weight loss. She developed rash postop. Given referral to dermatology and allergist. Rash resolved, hyperpigmentation noted to stomach. \par \par I have reviewed the patient's medical records and diagnostic images at the time of this office consultation and have made the following recommendation.\par Plan:\par 1. RTC PRN\par 2. Referral to dermatology\par \par

## 2020-04-24 NOTE — CONSULT LETTER
[FreeTextEntry3] : Keith Florez MD\par Professor, Cardiovascular & Thoracic Surgery\par [facility]\par Director of the [facility] \par Director of Thoracic Surgery, [facility]\par [facility]\par [address]\par [facility] 4th Floor\par [place] [address]\par Phone: [phone]\par Fax: [phone]

## 2020-05-08 RX ORDER — PANTOPRAZOLE 40 MG/1
40 TABLET, DELAYED RELEASE ORAL DAILY
Qty: 30 | Refills: 0 | Status: ACTIVE | COMMUNITY
Start: 2020-05-08 | End: 1900-01-01

## 2020-05-08 RX ORDER — OMEPRAZOLE 40 MG/1
40 CAPSULE, DELAYED RELEASE ORAL DAILY
Qty: 30 | Refills: 0 | Status: ACTIVE | COMMUNITY
Start: 2020-05-08 | End: 1900-01-01

## 2020-05-08 RX ORDER — SIMETHICONE 125 MG
125 CAPSULE ORAL
Qty: 30 | Refills: 0 | Status: ACTIVE | COMMUNITY
Start: 2020-05-08 | End: 1900-01-01

## 2020-05-08 RX ORDER — PANTOPRAZOLE 40 MG/1
40 TABLET, DELAYED RELEASE ORAL
Refills: 0 | Status: DISCONTINUED | COMMUNITY
End: 2020-05-08

## 2020-05-08 NOTE — HISTORY OF PRESENT ILLNESS
[FreeTextEntry1] : 50 year old female, nonsmoker, Cantonese speaking with pmhx of HTN, as referred by PCP Dr Joy Noyola for a large hiatal hernia. She is s/p EGD, laparoscopy, robotic assisted, reduction of herniated omentum in the paraesophageal hernia, primary repair of paraesophageal hernia on 10/25/19. \par \par Patient doing well, tolerating PO intake well. Denies dysphagia, abdominal pain, nausea, vomiting, diarrhea, constipation, weight loss. She developed rash postop. Given referral to dermatology and allergist. Rash resolved, hyperpigmentation noted to stomach.  No

## 2020-07-10 ENCOUNTER — APPOINTMENT (OUTPATIENT)
Dept: THORACIC SURGERY | Facility: CLINIC | Age: 50
End: 2020-07-10
Payer: MEDICAID

## 2020-07-10 PROCEDURE — 99212 OFFICE O/P EST SF 10 MIN: CPT | Mod: 95

## 2020-07-15 NOTE — ASSESSMENT
[FreeTextEntry1] : 50 year old female, nonsmoker, Cantonese speaking with pmhx of HTN, as referred by PCP Dr Joy Noyola for a large hiatal hernia. She is s/p EGD, laparoscopy, robotic assisted, reduction of herniated omentum in the paraesophageal hernia, primary repair of paraesophageal hernia on 10/25/19. \par \par She reports chronic swelling near the umbilicus and excessive bloating after eating, symptoms which were present even before the hiatal hernia repair. She recent saw GI Dr Brendon Lin on 7/2/20, who prescribed Miralax. \par \par Reviewed 4/2020 CT Chest with patient. There is a small umbilical hernia noted. It is benign and can be left alone. Patient would like to discuss repair/reduction of small hernia. \par \par I have reviewed the patient's medical records and diagnostic images at the time of this office consultation and have made the following recommendation.\par Plan:\par 1. RTC next week for surgical evaluation\par

## 2020-07-15 NOTE — END OF VISIT
[Time Spent: ___ minutes] : I have spent [unfilled] minutes of time on the encounter. [FreeTextEntry3] : I, PAPITO PATTON , am scribing for and in the presence of RED MARY the following sections: history of present illness, past medical/family/surgical/family/social history, review of systems, vital signs, physical exam, and disposition.\par

## 2020-07-15 NOTE — HISTORY OF PRESENT ILLNESS
[Medical Office: ([facility])___] : at the medical office located in  [Home] : at home, [unfilled] , at the time of the visit. [FreeTextEntry1] : 50 year old female, nonsmoker, Cantonese speaking with pmhx of HTN, as referred by PCP Dr Joy Noyola for a large hiatal hernia. She is s/p EGD, laparoscopy, robotic assisted, reduction of herniated omentum in the paraesophageal hernia, primary repair of paraesophageal hernia on 10/25/19. \par \par She reports chronic swelling near the umbilicus and excessive bloating after eating, symptoms which were present even before the hiatal hernia repair. She recent saw GI Dr Brendon Lin on 7/2/20, who prescribed Miralax. \par \par Reviewed 4/2020 CT Chest with patient. There is a small umbilical hernia noted. It is benign and can be left alone. Patient would like to discuss repair/reduction of small hernia.  [Verbal consent obtained from patient] : the patient, [unfilled]

## 2020-07-15 NOTE — CONSULT LETTER
[Dear  ___] : Dear  [unfilled], [Consult Letter:] : I had the pleasure of evaluating your patient, [unfilled]. [Please see my note below.] : Please see my note below. [Consult Closing:] : Thank you very much for allowing me to participate in the care of this patient.  If you have any questions, please do not hesitate to contact me. [Sincerely,] : Sincerely, [DrAmelie  ___] : Dr. ODONNELL [DrAmelie ___] : Dr. ODONNELL [FreeTextEntry3] : Keith Florez MD\par Professor, Cardiovascular & Thoracic Surgery\par [facility]\par Director of the [facility] \par Director of Thoracic Surgery, [facility]\par [facility]\par [address]\par [facility] 4th Floor\par [place] [address]\par Phone: [phone]\par Fax: [phone]

## 2020-07-17 ENCOUNTER — APPOINTMENT (OUTPATIENT)
Dept: THORACIC SURGERY | Facility: CLINIC | Age: 50
End: 2020-07-17
Payer: MEDICAID

## 2020-07-17 DIAGNOSIS — K44.9 DIAPHRAGMATIC HERNIA W/OUT OBSTRUCTION OR GANGRENE: ICD-10-CM

## 2020-07-17 DIAGNOSIS — K42.9 UMBILICAL HERNIA W/OUT OBSTRUCTION OR GANGRENE: ICD-10-CM

## 2020-07-17 PROCEDURE — 99214 OFFICE O/P EST MOD 30 MIN: CPT

## 2020-07-17 RX ORDER — OLANZAPINE 10 MG/1
10 TABLET ORAL
Refills: 0 | Status: ACTIVE | COMMUNITY

## 2020-07-17 RX ORDER — LORATADINE 5 MG
TABLET,CHEWABLE ORAL
Refills: 0 | Status: ACTIVE | COMMUNITY

## 2020-07-17 RX ORDER — RIVAROXABAN 2.5 MG/1
2.5 TABLET, FILM COATED ORAL
Refills: 0 | Status: ACTIVE | COMMUNITY

## 2020-07-17 RX ORDER — MAGESIUM CITRATE 1.75 G/29.6ML
1.75 LIQUID ORAL
Qty: 1 | Refills: 0 | Status: DISCONTINUED | COMMUNITY
Start: 2019-10-23 | End: 2020-07-17

## 2020-07-22 NOTE — HISTORY OF PRESENT ILLNESS
[FreeTextEntry1] : 50 year old female, nonsmoker, Cantonese speaking with pmhx of HTN, as referred by PCP Dr Joy Noyola for a large hiatal hernia. She is s/p EGD, laparoscopy, robotic assisted, reduction of herniated omentum in the paraesophageal hernia, primary repair of paraesophageal hernia on 10/25/19. \par \par She reports chronic swelling near the umbilicus and excessive bloating after eating, symptoms which were present even before the hiatal hernia repair. She recent saw GI Dr Brendon Lin on 7/2/20, who prescribed Miralax. \par \par Reviewed 4/2020 CT Chest with patient. There is a small umbilical hernia noted near the port incision. It is benign and can be left alone. Patient would like to discuss repair/reduction of small hernia. \par

## 2020-07-22 NOTE — PHYSICAL EXAM
[] : no respiratory distress [Respiration, Rhythm And Depth] : normal respiratory rhythm and effort [Exaggerated Use Of Accessory Muscles For Inspiration] : no accessory muscle use [Auscultation Breath Sounds / Voice Sounds] : lungs were clear to auscultation bilaterally [Apical Impulse] : the apical impulse was normal [Heart Sounds] : normal S1 and S2 [Examination Of The Chest] : the chest was normal in appearance [Abnormal Walk] : normal gait [Musculoskeletal - Swelling] : no joint swelling seen [FreeTextEntry1] : + small hernia, umbilical

## 2020-07-22 NOTE — CONSULT LETTER
[Dear  ___] : Dear  [unfilled], [Consult Letter:] : I had the pleasure of evaluating your patient, [unfilled]. [Sincerely,] : Sincerely, [Please see my note below.] : Please see my note below. [Consult Closing:] : Thank you very much for allowing me to participate in the care of this patient.  If you have any questions, please do not hesitate to contact me. [DrAmelie  ___] : Dr. ODONNELL [DrAmelie ___] : Dr. ODONNELL [FreeTextEntry3] : Keith Florez MD\par Professor, Cardiovascular & Thoracic Surgery\par [facility]\par Director of the [facility] \par Director of Thoracic Surgery, [facility]\par [facility]\par [address]\par [facility] 4th Floor\par [place] [address]\par Phone: [phone]\par Fax: [phone]

## 2020-07-22 NOTE — ASSESSMENT
[FreeTextEntry1] : 50 year old female, nonsmoker, Cantonese speaking with pmhx of HTN, as referred by PCP Dr Joy Noyola for a large hiatal hernia. She is s/p EGD, laparoscopy, robotic assisted, reduction of herniated omentum in the paraesophageal hernia, primary repair of paraesophageal hernia on 10/25/19. \par \par She reports chronic swelling near the umbilicus and excessive bloating after eating, symptoms which were present even before the hiatal hernia repair. She recent saw GI Dr Brendon Lin on 7/2/20, who prescribed Miralax. \par \par Reviewed 4/2020 CT Chest with patient. There is a small umbilical hernia noted near the port incision. It is benign and can be left alone. Patient would like to discuss repair/reduction of small hernia. Advise patient to see Dr Edis Shah, general surgeon.\par \par I have reviewed the patient's medical records and diagnostic images at the time of this office consultation and have made the following recommendation.\par Plan:\par 1. Refer back to Dr Edis Shah\par

## 2020-07-22 NOTE — END OF VISIT
[FreeTextEntry3] : I, PAPITO PATTON , am scribing for and in the presence of RED MARY the following sections: history of present illness, past medical/family/surgical/family/social history, review of systems, vital signs, physical exam, and disposition.\par

## 2021-03-25 ENCOUNTER — APPOINTMENT (OUTPATIENT)
Dept: GASTROENTEROLOGY | Facility: CLINIC | Age: 51
End: 2021-03-25
Payer: MEDICAID

## 2021-03-25 VITALS
BODY MASS INDEX: 26.75 KG/M2 | RESPIRATION RATE: 14 BRPM | HEIGHT: 63 IN | DIASTOLIC BLOOD PRESSURE: 70 MMHG | WEIGHT: 151 LBS | SYSTOLIC BLOOD PRESSURE: 120 MMHG | HEART RATE: 65 BPM | OXYGEN SATURATION: 97 %

## 2021-03-25 DIAGNOSIS — R13.10 DYSPHAGIA, UNSPECIFIED: ICD-10-CM

## 2021-03-25 DIAGNOSIS — K21.9 GASTRO-ESOPHAGEAL REFLUX DISEASE W/OUT ESOPHAGITIS: ICD-10-CM

## 2021-03-25 PROCEDURE — 99072 ADDL SUPL MATRL&STAF TM PHE: CPT

## 2021-03-25 PROCEDURE — 99204 OFFICE O/P NEW MOD 45 MIN: CPT

## 2021-03-26 LAB
ANION GAP SERPL CALC-SCNC: 9 MMOL/L
BASOPHILS # BLD AUTO: 0.03 K/UL
BASOPHILS NFR BLD AUTO: 0.7 %
BUN SERPL-MCNC: 14 MG/DL
CALCIUM SERPL-MCNC: 8.9 MG/DL
CHLORIDE SERPL-SCNC: 108 MMOL/L
CO2 SERPL-SCNC: 24 MMOL/L
CREAT SERPL-MCNC: 0.8 MG/DL
EOSINOPHIL # BLD AUTO: 0.1 K/UL
EOSINOPHIL NFR BLD AUTO: 2.3 %
GLUCOSE SERPL-MCNC: 128 MG/DL
HCT VFR BLD CALC: 37.8 %
HGB BLD-MCNC: 11.9 G/DL
IMM GRANULOCYTES NFR BLD AUTO: 0.2 %
LYMPHOCYTES # BLD AUTO: 0.98 K/UL
LYMPHOCYTES NFR BLD AUTO: 22.2 %
MAN DIFF?: NORMAL
MCHC RBC-ENTMCNC: 27.9 PG
MCHC RBC-ENTMCNC: 31.5 GM/DL
MCV RBC AUTO: 88.7 FL
MONOCYTES # BLD AUTO: 0.32 K/UL
MONOCYTES NFR BLD AUTO: 7.3 %
NEUTROPHILS # BLD AUTO: 2.97 K/UL
NEUTROPHILS NFR BLD AUTO: 67.3 %
PLATELET # BLD AUTO: 192 K/UL
POTASSIUM SERPL-SCNC: 3.9 MMOL/L
RBC # BLD: 4.26 M/UL
RBC # FLD: 15.1 %
SODIUM SERPL-SCNC: 141 MMOL/L
WBC # FLD AUTO: 4.41 K/UL

## 2021-04-17 ENCOUNTER — LABORATORY RESULT (OUTPATIENT)
Age: 51
End: 2021-04-17

## 2021-04-20 ENCOUNTER — OUTPATIENT (OUTPATIENT)
Dept: OUTPATIENT SERVICES | Facility: HOSPITAL | Age: 51
LOS: 1 days | Discharge: HOME | End: 2021-04-20
Payer: MEDICAID

## 2021-04-20 ENCOUNTER — APPOINTMENT (OUTPATIENT)
Dept: GASTROENTEROLOGY | Facility: HOSPITAL | Age: 51
End: 2021-04-20

## 2021-04-20 DIAGNOSIS — Z98.890 OTHER SPECIFIED POSTPROCEDURAL STATES: Chronic | ICD-10-CM

## 2021-04-20 PROCEDURE — 91010 ESOPHAGUS MOTILITY STUDY: CPT | Mod: 26

## 2021-04-20 PROCEDURE — 91010 ESOPHAGUS MOTILITY STUDY: CPT

## 2021-05-18 ENCOUNTER — APPOINTMENT (OUTPATIENT)
Dept: GASTROENTEROLOGY | Facility: HOSPITAL | Age: 51
End: 2021-05-18

## 2021-05-18 ENCOUNTER — OUTPATIENT (OUTPATIENT)
Dept: OUTPATIENT SERVICES | Facility: HOSPITAL | Age: 51
LOS: 1 days | Discharge: HOME | End: 2021-05-18
Payer: MEDICAID

## 2021-05-18 DIAGNOSIS — Z98.890 OTHER SPECIFIED POSTPROCEDURAL STATES: Chronic | ICD-10-CM

## 2021-05-18 PROCEDURE — 91040 ESOPH BALLOON DISTENSION TST: CPT

## 2021-05-18 PROCEDURE — 43235 EGD DIAGNOSTIC BRUSH WASH: CPT

## 2021-05-18 PROCEDURE — C1889: CPT

## 2021-11-12 ENCOUNTER — OUTPATIENT (OUTPATIENT)
Dept: OUTPATIENT SERVICES | Facility: HOSPITAL | Age: 51
LOS: 1 days | End: 2021-11-12
Payer: MEDICAID

## 2021-11-12 ENCOUNTER — APPOINTMENT (OUTPATIENT)
Dept: RADIOLOGY | Facility: HOSPITAL | Age: 51
End: 2021-11-12
Payer: MEDICAID

## 2021-11-12 DIAGNOSIS — Z98.890 OTHER SPECIFIED POSTPROCEDURAL STATES: Chronic | ICD-10-CM

## 2021-11-12 PROCEDURE — 74220 X-RAY XM ESOPHAGUS 1CNTRST: CPT

## 2021-11-12 PROCEDURE — 74220 X-RAY XM ESOPHAGUS 1CNTRST: CPT | Mod: 26

## 2024-09-18 PROBLEM — Z87.19 HISTORY OF UMBILICAL HERNIA: Status: RESOLVED | Noted: 2019-08-19 | Resolved: 2024-09-18

## 2024-09-18 PROBLEM — R44.0 AUDITORY HALLUCINATION: Status: RESOLVED | Noted: 2024-09-18 | Resolved: 2024-09-18

## 2024-09-18 PROBLEM — Z86.73 HISTORY OF CEREBROVASCULAR ACCIDENT: Status: RESOLVED | Noted: 2024-09-18 | Resolved: 2024-09-18

## 2024-09-18 PROBLEM — N20.0 LEFT RENAL STONE: Status: ACTIVE | Noted: 2024-09-18

## 2024-09-18 PROBLEM — R21 RASH: Status: RESOLVED | Noted: 2019-12-06 | Resolved: 2024-09-18

## 2024-09-18 PROBLEM — Z09 POSTOP CHECK: Status: RESOLVED | Noted: 2019-11-04 | Resolved: 2024-09-18

## 2024-09-18 RX ORDER — GABAPENTIN 400 MG/1
400 CAPSULE ORAL
Refills: 0 | Status: ACTIVE | COMMUNITY

## 2024-09-18 RX ORDER — MIRTAZAPINE 45 MG/1
45 TABLET, FILM COATED ORAL
Refills: 0 | Status: ACTIVE | COMMUNITY

## 2024-09-18 RX ORDER — METOPROLOL SUCCINATE 25 MG/1
25 TABLET, EXTENDED RELEASE ORAL DAILY
Refills: 0 | Status: ACTIVE | COMMUNITY

## 2024-09-18 RX ORDER — ASENAPINE 10 MG/1
10 TABLET SUBLINGUAL
Refills: 0 | Status: ACTIVE | COMMUNITY

## 2024-09-18 RX ORDER — OLMESARTAN MEDOXOMIL / AMLODIPINE BESYLATE / HYDROCHLOROTHIAZIDE 40; 10; 25 MG/1; MG/1; MG/1
40-10-25 TABLET, FILM COATED ORAL DAILY
Refills: 0 | Status: ACTIVE | COMMUNITY

## 2024-09-18 RX ORDER — CLONAZEPAM 2 MG/1
2 TABLET ORAL
Refills: 0 | Status: ACTIVE | COMMUNITY

## 2024-09-18 RX ORDER — HYDROCHLOROTHIAZIDE 25 MG/1
25 TABLET ORAL DAILY
Refills: 0 | Status: ACTIVE | COMMUNITY

## 2024-09-18 RX ORDER — ESOMEPRAZOLE MAGNESIUM 40 MG/1
40 CAPSULE, DELAYED RELEASE ORAL DAILY
Refills: 0 | Status: ACTIVE | COMMUNITY

## 2024-09-19 NOTE — SOCIAL HISTORY
[TextEntry] : COVID history: vaccinated  Lung TB history:  Patient lives with family/alone/with home aid

## 2024-09-20 ENCOUNTER — APPOINTMENT (OUTPATIENT)
Dept: THORACIC SURGERY | Facility: CLINIC | Age: 54
End: 2024-09-20
Payer: MEDICARE

## 2024-09-20 VITALS
TEMPERATURE: 97.5 F | HEART RATE: 65 BPM | OXYGEN SATURATION: 96 % | HEIGHT: 64 IN | DIASTOLIC BLOOD PRESSURE: 91 MMHG | WEIGHT: 164 LBS | SYSTOLIC BLOOD PRESSURE: 132 MMHG | BODY MASS INDEX: 28 KG/M2

## 2024-09-20 DIAGNOSIS — Z87.19 PERSONAL HISTORY OF OTHER DISEASES OF THE DIGESTIVE SYSTEM: ICD-10-CM

## 2024-09-20 DIAGNOSIS — R44.0 AUDITORY HALLUCINATIONS: ICD-10-CM

## 2024-09-20 DIAGNOSIS — R14.0 ABDOMINAL DISTENSION (GASEOUS): ICD-10-CM

## 2024-09-20 DIAGNOSIS — R21 RASH AND OTHER NONSPECIFIC SKIN ERUPTION: ICD-10-CM

## 2024-09-20 DIAGNOSIS — N20.0 CALCULUS OF KIDNEY: ICD-10-CM

## 2024-09-20 DIAGNOSIS — Z86.73 PERSONAL HISTORY OF TRANSIENT ISCHEMIC ATTACK (TIA), AND CEREBRAL INFARCTION W/OUT RESIDUAL DEFICITS: ICD-10-CM

## 2024-09-20 DIAGNOSIS — Z09 ENCOUNTER FOR FOLLOW-UP EXAMINATION AFTER COMPLETED TREATMENT FOR CONDITIONS OTHER THAN MALIGNANT NEOPLASM: ICD-10-CM

## 2024-09-20 DIAGNOSIS — K59.00 CONSTIPATION, UNSPECIFIED: ICD-10-CM

## 2024-09-20 PROCEDURE — 99205 OFFICE O/P NEW HI 60 MIN: CPT

## 2024-09-23 RX ORDER — OLANZAPINE 20 MG/1
20 TABLET, FILM COATED ORAL
Qty: 14 | Refills: 0 | Status: ACTIVE | COMMUNITY
Start: 2024-09-05

## 2024-09-23 RX ORDER — QUETIAPINE FUMARATE 50 MG/1
50 TABLET ORAL
Qty: 21 | Refills: 0 | Status: ACTIVE | COMMUNITY
Start: 2024-09-05

## 2024-09-23 NOTE — CONSULT LETTER
[Dear  ___] : Dear  [unfilled], [Consult Letter:] : I had the pleasure of evaluating your patient, [unfilled]. [Please see my note below.] : Please see my note below. [Consult Closing:] : Thank you very much for allowing me to participate in the care of this patient.  If you have any questions, please do not hesitate to contact me. [Sincerely,] : Sincerely, [FreeTextEntry2] : Refer GI Dr. Brendon Lin PCP: Dr. Joy Noyola [FreeTextEntry3] : Keith Florez MD Professor, Cardiovascular & Thoracic Surgery [facility] [employer] Director of the [facility]  Director of Thoracic Surgery, [address] [place] [address] Phone: [phone] Fax: [phone]

## 2024-09-23 NOTE — HISTORY OF PRESENT ILLNESS
[FreeTextEntry1] : LORI GARZA is a 54-year-old F, Cantonese speaking, nonsmoker, with PMHx of HTN, Stroke with left-side weakness (Jan 2020), Raynaud's syndrome, chronic constipation, paraesophageal hernia (s/p repair on 10/25/2019 by Dr. Keith Florez), Umbilical hernia (s/p repair by Edis Shah 09/2020), auditory hallucination, who is referred by GI Dr. Brendon Lin for evaluation of a recurrent symptomatic paraesophageal hernia.   Patient has had chronic acid reflux. Symptoms had improved after paraesophageal hernia repair but not completely resolved.  Admits to dysphagia as well.  She had Manometry on 04/20/2021 with Dr. Alatorre, which indicated EGJ outflow obstruction. She was then followed by Dr. Brendon Lin. Per note, she has intermittent bloating and epigastric pain. Dysphagia associated with some regurgitation/vomiting. Symptoms are associated with almost every meal. Feeling food stuck at the level of GE junction.   CT chest on 09/05/2024 - Large right-sided para-esophageal fat containing hiatal hernia, likely containing omentum. The amount of herniated fat is less than on the prior chest CT of 2019 and 2018. The hernia was not present on a prior CT of 2020.  - Stable 0.3cm right middle lobe nodule - Sub-centimete non-obstructing left renal stone.   Manometry on 04/20/2021 Findings: 1. impaired deglutitive inhibition, elevated IRP of 34.9mmHg 2. 80% supine swallows had normal peristalsis, 20% supine swallows were ineffective 3. 80% swallows were cleared based on impedance analysis 4. 5 upright swallows had normal peristalsis, but elevated IRP 5. good esophageal reserve on chug maneuver 6. no manometric evidence of hiatal hernia Impressions: -As per [place] classification, this is consistent with EGJ outflow obstruction.

## 2024-09-23 NOTE — ASSESSMENT
[FreeTextEntry1] : LORI GARZA is a 54-year-old F, Cantonese speaking, nonsmoker, with PMHx of HTN, Stroke with left-side weakness (Jan 2020), Raynaud's syndrome, chronic constipation, paraesophageal hernia (s/p repair on 10/25/2019 by Dr. Keith Florez), Umbilical hernia (s/p repair by Edis Shah 09/2020), auditory hallucination, who is referred by GI Dr. Brendon Lin for evaluation of a recurrent symptomatic paraesophageal hernia.   I have independently reviewed the medical records and imaging at the time of this office consultation, and discussed the following interpretations with the patient:  Patient has a history of hiatal hernia and had laparoscopic surgery in Oct 2019 for the reduction of herniated omentum in the paraesophageal hernia.  The recent CT chest on 09/05/2024 revealed the recurrence of omental herniation.  Her symptoms are chronic acid reflux (this has improved when comparing to that before last operation), food stuck, and intermittent epigastric bloating and pain. It is not clear if her symptoms are consistent with omental herniation. Her manometry in April 2021 was suggestive to EGJ outflow obstruction. I would like to obtain a barium esophagram for further evaluation.    Patient was advised to contact us should any concerning symptoms arise. Patient verbally understood and agreed with the plan.   Plan: - Barium esophagram - RTO for discussion  I, Dr. Keith Florez MD, personally performed the evaluation and management (E/M) services for this new patient. That E/M includes conducting the clinically appropriate initial history &/or exam, assessing all conditions, and establishing the plan of care. Today, my NP, Quiana Arias, was here to observe &/or participate in the visit & follow plan of care established by me.

## 2024-09-23 NOTE — CONSULT LETTER
[Dear  ___] : Dear  [unfilled], [Consult Letter:] : I had the pleasure of evaluating your patient, [unfilled]. [Please see my note below.] : Please see my note below. [Consult Closing:] : Thank you very much for allowing me to participate in the care of this patient.  If you have any questions, please do not hesitate to contact me. [Sincerely,] : Sincerely, [FreeTextEntry2] : Refer GI Dr. Brendon Lin PCP: Dr. Joy Noyola [FreeTextEntry3] : Keith Florez MD Professor, Cardiovascular & Thoracic Surgery [facility] [employer] Director of the [facility]  Director of Thoracic Surgery, [address] [place] [address] Phone: [phone] Fax: [phone]

## 2024-09-23 NOTE — DATA REVIEWED
[FreeTextEntry1] : CT abd w/wo contrast on 03/21/2022 - anterior abdominal wall post surgical changes related to prior hernia surgical repair - hiatal hernia  Esophagram on 11/12/2021 - unremarkable. No evidence of obstruction or stricture.   MRI ABD w/wo contrast on 12/23/2021 - Benign sub-centimeter hepatic cyst - Fat containing hiatal hernia   CT abd wo contrast on 04/18/2020 - Previously seen fat containing hiatal hernia is not seen today  CT chest on 10/09/2019 - 1-yr stability of the 0.3cm right middle lobe nodule.  - Large fat-containing hiatal hernia - New hyperdense material within the bowel, likely related to barium ingestion.   GI series and esophagram on 10/09/2019 - normal esphagram and upper GI series. The large posterior mediastional fatty mass has no effect on the GI tract.

## 2024-09-23 NOTE — HISTORY OF PRESENT ILLNESS
[FreeTextEntry1] : LORI GARZA is a 54-year-old F, Cantonese speaking, nonsmoker, with PMHx of HTN, Stroke with left-side weakness (Jan 2020), Raynaud's syndrome, chronic constipation, paraesophageal hernia (s/p repair on 10/25/2019 by Dr. Keith Florez), Umbilical hernia (s/p repair by Edis Shah 09/2020), auditory hallucination, who is referred by GI Dr. Brendon Lin for evaluation of a recurrent symptomatic paraesophageal hernia.   Patient has had chronic acid reflux. Symptoms had improved after paraesophageal hernia repair but not completely resolved.  Admits to dysphagia as well.  She had Manometry on 04/20/2021 with Dr. Alatorre, which indicated EGJ outflow obstruction. She was then followed by Dr. Brendon Lin. Per note, she has intermittent bloating and epigastric pain. Dysphagia associated with some regurgitation/vomiting. Symptoms are associated with almost every meal. Feeling food stuck at the level of GE junction.   CT chest on 09/05/2024 - Large right-sided para-esophageal fat containing hiatal hernia, likely containing omentum. The amount of herniated fat is less than on the prior chest CT of 2019 and 2018. The hernia was not present on a prior CT of 2020.  - Stable 0.3cm right middle lobe nodule - Sub-centimete non-obstructing left renal stone.   Manometry on 04/20/2021 Findings: 1. impaired deglutitive inhibition, elevated IRP of 34.9mmHg 2. 80% supine swallows had normal peristalsis, 20% supine swallows were ineffective 3. 80% swallows were cleared based on impedance analysis 4. 5 upright swallows had normal peristalsis, but elevated IRP 5. good esophageal reserve on chug maneuver 6. no manometric evidence of hiatal hernia Impressions: -As per [place] classification, this is consistent with EGJ outflow obstruction.

## 2024-09-23 NOTE — PHYSICAL EXAM
[General Appearance - Alert] : alert [Neck Appearance] : the appearance of the neck was normal [Neck Cervical Mass (___cm)] : no neck mass was observed [Jugular Venous Distention Increased] : there was no jugular-venous distention [Thyroid Diffuse Enlargement] : the thyroid was not enlarged [Thyroid Nodule] : there were no palpable thyroid nodules [Auscultation Breath Sounds / Voice Sounds] : lungs were clear to auscultation bilaterally [Heart Rate And Rhythm] : heart rate was normal and rhythm regular [Heart Sounds] : normal S1 and S2 [Heart Sounds Gallop] : no gallops [Murmurs] : no murmurs [Heart Sounds Pericardial Friction Rub] : no pericardial rub [Bowel Sounds] : normal bowel sounds [Abdomen Soft] : soft [Abdomen Tenderness] : non-tender [] : no hepato-splenomegaly [Abdomen Mass (___ Cm)] : no abdominal mass palpated [Deep Tendon Reflexes (DTR)] : deep tendon reflexes were 2+ and symmetric [Sensation] : the sensory exam was normal to light touch and pinprick [No Focal Deficits] : no focal deficits [Oriented To Time, Place, And Person] : oriented to person, place, and time [Impaired Insight] : insight and judgment were intact [Affect] : the affect was normal

## 2024-09-23 NOTE — REVIEW OF SYSTEMS
[Constipation] : constipation [Heartburn] : heartburn [Arthralgias] : arthralgias [Joint Stiffness] : joint stiffness [Negative] : Psychiatric [Vomiting] : no vomiting [Diarrhea] : no diarrhea [Melena] : no melena [FreeTextEntry7] : bloating, food stuck

## 2024-09-23 NOTE — CONSULT LETTER
[Dear  ___] : Dear  [unfilled], [Consult Letter:] : I had the pleasure of evaluating your patient, [unfilled]. [Please see my note below.] : Please see my note below. [Consult Closing:] : Thank you very much for allowing me to participate in the care of this patient.  If you have any questions, please do not hesitate to contact me. [Sincerely,] : Sincerely, [FreeTextEntry2] : Refer GI Dr. Brendon Lin PCP: Dr. Joy Noyola [FreeTextEntry3] : Keith Florez MD Professor, Cardiovascular & Thoracic Surgery [facility] [employer] Director of the [facility]  Director of Thoracic Surgery, [address] [place] [address] Phone: [phone] Fax: [phone]

## 2024-09-23 NOTE — HISTORY OF PRESENT ILLNESS
[FreeTextEntry1] : LORI GARZA is a 54-year-old F, Cantonese speaking, nonsmoker, with PMHx of HTN, Stroke with left-side weakness (Jan 2020), Raynaud's syndrome, chronic constipation, paraesophageal hernia (s/p repair on 10/25/2019 by Dr. Keith Florez), Umbilical hernia (s/p repair by Edis Shah 09/2020), auditory hallucination, who is referred by GI Dr. Brendon Lin for evaluation of a recurrent symptomatic paraesophageal hernia.   Patient has had chronic acid reflux. Symptoms had improved after paraesophageal hernia repair but not completely resolved.  Admits to dysphagia as well.  She had Manometry on 04/20/2021 with Dr. Alatorre, which indicated EGJ outflow obstruction. She was then followed by Dr. Brendon Lin. Per note, she has intermittent bloating and epigastric pain. Dysphagia associated with some regurgitation/vomiting. Symptoms are associated with almost every meal. Feeling food stuck at the level of GE junction.   CT chest on 09/05/2024 - Large right-sided para-esophageal fat containing hiatal hernia, likely containing omentum. The amount of herniated fat is less than on the prior chest CT of 2019 and 2018. The hernia was not present on a prior CT of 2020.  - Stable 0.3cm right middle lobe nodule - Sub-centimete non-obstructing left renal stone.   Manometry on 04/20/2021 Findings: 1. impaired deglutitive inhibition, elevated IRP of 34.9mmHg 2. 80% supine swallows had normal peristalsis, 20% supine swallows were ineffective 3. 80% swallows were cleared based on impedance analysis 4. 5 upright swallows had normal peristalsis, but elevated IRP 5. good esophageal reserve on chug maneuver 6. no manometric evidence of hiatal hernia Impressions: -As per [place] classification, this is consistent with EGJ outflow obstruction.

## 2024-09-23 NOTE — REASON FOR VISIT
[Consultation] : a consultation visit [Family Member] : family member [FreeTextEntry1] : hiatal hernia

## 2024-09-27 ENCOUNTER — APPOINTMENT (OUTPATIENT)
Dept: THORACIC SURGERY | Facility: CLINIC | Age: 54
End: 2024-09-27
Payer: MEDICARE

## 2024-09-27 VITALS
TEMPERATURE: 97.9 F | SYSTOLIC BLOOD PRESSURE: 137 MMHG | OXYGEN SATURATION: 98 % | HEART RATE: 70 BPM | HEIGHT: 64 IN | WEIGHT: 162 LBS | BODY MASS INDEX: 27.66 KG/M2 | DIASTOLIC BLOOD PRESSURE: 93 MMHG

## 2024-09-27 DIAGNOSIS — K21.9 GASTRO-ESOPHAGEAL REFLUX DISEASE W/OUT ESOPHAGITIS: ICD-10-CM

## 2024-09-27 DIAGNOSIS — R13.10 DYSPHAGIA, UNSPECIFIED: ICD-10-CM

## 2024-09-27 DIAGNOSIS — R10.9 UNSPECIFIED ABDOMINAL PAIN: ICD-10-CM

## 2024-09-27 DIAGNOSIS — K44.9 DIAPHRAGMATIC HERNIA W/OUT OBSTRUCTION OR GANGRENE: ICD-10-CM

## 2024-09-27 DIAGNOSIS — R14.0 ABDOMINAL DISTENSION (GASEOUS): ICD-10-CM

## 2024-09-27 PROCEDURE — 99213 OFFICE O/P EST LOW 20 MIN: CPT

## 2024-09-30 NOTE — DATA REVIEWED
[FreeTextEntry1] : CT chest on 09/05/2024 - Large right-sided para-esophageal fat containing hiatal hernia, likely containing omentum. The amount of herniated fat is less than on the prior chest CT of 2019 and 2018. The hernia was not present on a prior CT of 2020. - Stable 0.3cm right middle lobe nodule - Sub-centimete non-obstructing left renal stone.  Manometry on 04/20/2021 Findings: 1. impaired deglutitive inhibition, elevated IRP of 34.9mmHg 2. 80% supine swallows had normal peristalsis, 20% supine swallows were ineffective 3. 80% swallows were cleared based on impedance analysis 4. 5 upright swallows had normal peristalsis, but elevated IRP 5. good esophageal reserve on chug maneuver 6. no manometric evidence of hiatal hernia Impressions: -As per [place] classification, this is consistent with EGJ outflow obstruction.

## 2024-09-30 NOTE — CONSULT LETTER
[Dear  ___] : Dear  [unfilled], [Consult Letter:] : I had the pleasure of evaluating your patient, [unfilled]. [Please see my note below.] : Please see my note below. [Consult Closing:] : Thank you very much for allowing me to participate in the care of this patient.  If you have any questions, please do not hesitate to contact me. [Sincerely,] : Sincerely, [FreeTextEntry3] : Keith Florez MD Professor, Cardiovascular & Thoracic Surgery [facility] [employer] Director of the [facility]  Director of Thoracic Surgery, [address] [place] [address] Phone: [phone] Fax: [phone]

## 2024-09-30 NOTE — HISTORY OF PRESENT ILLNESS
[FreeTextEntry1] : LORI GARZA is a 54-year-old F, Cantonese speaking, nonsmoker, with PMHx of HTN, Stroke with left-side weakness (Jan 2020), Raynaud's syndrome, chronic constipation, herniated omentum in the paraesophageal hernia (s/p reduction on 10/25/2019 by me), Umbilical hernia (s/p repair by Edis Shah 09/2020), auditory hallucination, who is referred by GI Dr. Brendon Lin for evaluation of a recurrent symptomatic paraesophageal hernia.  Patient has had chronic acid reflux and dysphagia. Symptoms had improved after paraesophageal hernia repair but not completely resolved. She had Manometry on 04/20/2021 with Dr. Alatorre, which indicated EGJ outflow obstruction. She was then followed by Dr. Brendon Lin.  Patient reports intermittent bloating and epigastric pain/RUQ pain. Pain worse after meals.  She also feels food stuck at the level of mid esophagus, associated with some regurgitation/vomiting. Symptoms better if taking soft food with a slow pace. Her weight has been stable.    She presents today after completion of an esophagram.   Esophagram on 9/21/24:  -There is normal esophageal motility with a normal swallowing mechanism and normal primary peristaltic wave.  -No gastroesophageal reflux was demonstrated.  -Right lower posterior mediastinal large fat containing hiatal hernia better characterized on the recent abdominopelvic CT dated 9/20/2024.

## 2024-09-30 NOTE — PHYSICAL EXAM
[Neck Appearance] : the appearance of the neck was normal [Neck Cervical Mass (___cm)] : no neck mass was observed [Jugular Venous Distention Increased] : there was no jugular-venous distention [Thyroid Diffuse Enlargement] : the thyroid was not enlarged [Thyroid Nodule] : there were no palpable thyroid nodules [Auscultation Breath Sounds / Voice Sounds] : lungs were clear to auscultation bilaterally [Heart Rate And Rhythm] : heart rate was normal and rhythm regular [Heart Sounds] : normal S1 and S2 [Heart Sounds Gallop] : no gallops [Murmurs] : no murmurs [Heart Sounds Pericardial Friction Rub] : no pericardial rub [Bowel Sounds] : normal bowel sounds [Abdomen Soft] : soft [Abdomen Tenderness] : non-tender [] : no hepato-splenomegaly [Abdomen Mass (___ Cm)] : no abdominal mass palpated [Deep Tendon Reflexes (DTR)] : deep tendon reflexes were 2+ and symmetric [Sensation] : the sensory exam was normal to light touch and pinprick [No Focal Deficits] : no focal deficits [Oriented To Time, Place, And Person] : oriented to person, place, and time [Impaired Insight] : insight and judgment were intact [Affect] : the affect was normal

## 2024-09-30 NOTE — CONSULT LETTER
[Dear  ___] : Dear  [unfilled], [Consult Letter:] : I had the pleasure of evaluating your patient, [unfilled]. [Please see my note below.] : Please see my note below. [Consult Closing:] : Thank you very much for allowing me to participate in the care of this patient.  If you have any questions, please do not hesitate to contact me. [Sincerely,] : Sincerely, [FreeTextEntry3] : Keith Florez MD Professor, Cardiovascular & Thoracic Surgery [facility] [employer] Director of the [facility]  Director of Thoracic Surgery, [address] [place] [address] Phone: [phone] Fax: [phone]

## 2024-09-30 NOTE — DATA REVIEWED
[FreeTextEntry1] : CT chest on 09/05/2024 - Large right-sided para-esophageal fat containing hiatal hernia, likely containing omentum. The amount of herniated fat is less than on the prior chest CT of 2019 and 2018. The hernia was not present on a prior CT of 2020. - Stable 0.3cm right middle lobe nodule - Sub-centimete non-obstructing left renal stone.  Manometry on 04/20/2021 Findings: 1. impaired deglutitive inhibition, elevated IRP of 34.9mmHg 2. 80% supine swallows had normal peristalsis, 20% supine swallows were ineffective 3. 80% swallows were cleared based on impedance analysis 4. 5 upright swallows had normal peristalsis, but elevated IRP 5. good esophageal reserve on chug maneuver 6. no manometric evidence of hiatal hernia Impressions: -As per [place] classification, this is consistent with EGJ outflow obstruction.

## 2024-09-30 NOTE — ASSESSMENT
[FreeTextEntry1] : LORI GARZA is a 54-year-old F, Cantonese speaking, with a hx of herniated omentum in the paraesophageal hernia s/p reduction on 10/25/2019.  She is referred back by GI Dr. Brendon Lin for evaluation of a recurrent  paraesophageal hernia.  CT chest on 09/05/2024 was revealed the recurrence of omental herniation.  Patient has had chronic acid reflux, dysphagia, bloating, and epigastric/RUQ pain. Symptoms improved after the prior omentum reduction surgery in 2019, but recurred in past 2-3 years.   I reviewed her esophagram completed on 09/21/2024, there's evidence of gastroesophageal reflux. Her esophagus moves normally without dysmotility. Explained to the patient that the etiology of her symptoms is not clear.  Both GERD and omentum herniation can have similar manifestation. Given her symptoms improved after the prior surgery, a redo reduction should be considered.  Will plan for EGD, Laparoscopy, Robotic assisted, Reduction of herniated omentum in the paraesophageal hernia, possible mesh. The risks, benefits and alternatives of proceeding with above surgery were discussed with the patient. Patient would like to discuss the plan with her family.  Will reach us out if decided to proceed.   Plan: follow up as needed.    I, Dr. Keith Florez MD, personally performed the evaluation and management (E/M) services for this established patient who presents today with (a) new problem(s)/exacerbation of (an) existing condition(s).  That E/M includes conducting the clinically appropriate interval history &/or exam, assessing all new/exacerbated conditions, and establishing a new plan of care.  Today, my NP, Quiana Arias, was here to observe &/or participate in the visit & follow plan of care established by me.

## 2024-09-30 NOTE — DATA REVIEWED
[FreeTextEntry1] : CT chest on 09/05/2024 - Large right-sided para-esophageal fat containing hiatal hernia, likely containing omentum. The amount of herniated fat is less than on the prior chest CT of 2019 and 2018. The hernia was not present on a prior CT of 2020. - Stable 0.3cm right middle lobe nodule - Sub-centimete non-obstructing left renal stone.  Manometry on 04/20/2021 Findings: 1. impaired deglutitive inhibition, elevated IRP of 34.9mmHg 2. 80% supine swallows had normal peristalsis, 20% supine swallows were ineffective 3. 80% swallows were cleared based on impedance analysis 4. 5 upright swallows had normal peristalsis, but elevated IRP 5. good esophageal reserve on chug maneuver 6. no manometric evidence of hiatal hernia Impressions: -As per [place] classification, this is consistent with EGJ outflow obstruction.

## 2024-09-30 NOTE — CONSULT LETTER
[Dear  ___] : Dear  [unfilled], [Consult Letter:] : I had the pleasure of evaluating your patient, [unfilled]. [Please see my note below.] : Please see my note below. [Consult Closing:] : Thank you very much for allowing me to participate in the care of this patient.  If you have any questions, please do not hesitate to contact me. [Sincerely,] : Sincerely, [FreeTextEntry3] : Keith Florez MD Professor, Cardiovascular & Thoracic Surgery [facility] [employer] Director of the [facility]  Director of Thoracic Surgery, [address] [place] [address] Phone: [phone] Fax: [phone]

## 2024-10-21 NOTE — DISCHARGE NOTE PROVIDER - [id number]_ALL_CORE_FT
PRINCIPAL PROCEDURE  Procedure: Repair, hernia, hiatal, with fundoplication  Findings and Treatment:
Yes